# Patient Record
Sex: FEMALE | Race: WHITE | NOT HISPANIC OR LATINO | Employment: STUDENT | URBAN - METROPOLITAN AREA
[De-identification: names, ages, dates, MRNs, and addresses within clinical notes are randomized per-mention and may not be internally consistent; named-entity substitution may affect disease eponyms.]

---
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
## 2024-04-01 ENCOUNTER — OFFICE VISIT (OUTPATIENT)
Dept: URGENT CARE | Facility: CLINIC | Age: 19
End: 2024-04-01
Payer: COMMERCIAL

## 2024-04-01 VITALS
RESPIRATION RATE: 18 BRPM | SYSTOLIC BLOOD PRESSURE: 129 MMHG | OXYGEN SATURATION: 99 % | DIASTOLIC BLOOD PRESSURE: 91 MMHG | HEART RATE: 85 BPM | BODY MASS INDEX: 18.9 KG/M2 | WEIGHT: 84 LBS | HEIGHT: 56 IN | TEMPERATURE: 98 F

## 2024-04-01 DIAGNOSIS — S83.91XA SPRAIN OF RIGHT KNEE, UNSPECIFIED LIGAMENT, INITIAL ENCOUNTER: Primary | ICD-10-CM

## 2024-04-01 PROCEDURE — 99213 OFFICE O/P EST LOW 20 MIN: CPT | Mod: 25,S$GLB,, | Performed by: NURSE PRACTITIONER

## 2024-04-01 PROCEDURE — 96372 THER/PROPH/DIAG INJ SC/IM: CPT | Mod: S$GLB,,, | Performed by: NURSE PRACTITIONER

## 2024-04-01 RX ORDER — IBUPROFEN 400 MG/1
400 TABLET ORAL EVERY 8 HOURS PRN
Qty: 30 TABLET | Refills: 0 | Status: SHIPPED | OUTPATIENT
Start: 2024-04-01

## 2024-04-01 RX ORDER — METHYLPREDNISOLONE 4 MG/1
TABLET ORAL
Qty: 21 EACH | Refills: 0 | Status: SHIPPED | OUTPATIENT
Start: 2024-04-01 | End: 2024-04-22

## 2024-04-01 RX ORDER — KETOROLAC TROMETHAMINE 30 MG/ML
30 INJECTION, SOLUTION INTRAMUSCULAR; INTRAVENOUS
Status: COMPLETED | OUTPATIENT
Start: 2024-04-01 | End: 2024-04-01

## 2024-04-01 RX ADMIN — KETOROLAC TROMETHAMINE 30 MG: 30 INJECTION, SOLUTION INTRAMUSCULAR; INTRAVENOUS at 03:04

## 2024-04-01 NOTE — PROGRESS NOTES
"Subjective:      Patient ID: Danica Mock is a 18 y.o. female.    Vitals:  height is 4' 8" (1.422 m) and weight is 38.1 kg (84 lb). Her temperature is 98.3 °F (36.8 °C). Her blood pressure is 129/91 (abnormal) and her pulse is 85. Her respiration is 18 and oxygen saturation is 99%.     Chief Complaint: Knee Injury    This is a 18 y.o. female who presents today with a chief complaint of right knee pain X2 days. Pt ran 6-mile marathon when knee started hurting.  States it feels as though her knee was hit by a hammer. Denies fall or impact to the knee. Pt states the next day she started to feel a popping sensation-that has continued. Reports edema this morning that has since subsided.     Knee Pain   The incident occurred 12 to 24 hours ago. There was no injury mechanism. The pain is present in the right knee. Quality: soreness. The pain is at a severity of 5/10. The pain is moderate. The pain has been Fluctuating since onset. Pertinent negatives include no inability to bear weight, loss of motion, loss of sensation, muscle weakness, numbness or tingling. She reports no foreign bodies present. The symptoms are aggravated by movement and weight bearing. She has tried nothing for the symptoms.       Musculoskeletal:  Positive for pain, joint pain and joint swelling.   Neurological:  Negative for numbness.      Objective:     Physical Exam   Constitutional: She is oriented to person, place, and time.  Non-toxic appearance. She does not appear ill. No distress.   HENT:   Head: Normocephalic.   Nose: Nose normal.   Mouth/Throat: Mucous membranes are moist.   Cardiovascular: Normal rate.   Pulmonary/Chest: Effort normal.   Abdominal: Normal appearance.   Musculoskeletal: Normal range of motion.         General: Tenderness and signs of injury (overuse) present. No swelling or deformity. Normal range of motion.      Comments: No edema or erythema to right knee at this time. Has tenderness to palpation.    Neurological: She " is alert and oriented to person, place, and time.   Skin: Skin is warm, dry and not diaphoretic.   Psychiatric: Her behavior is normal. Mood normal.   Nursing note and vitals reviewed.      Assessment:     1. Sprain of right knee, unspecified ligament, initial encounter        Plan:       Sprain of right knee, unspecified ligament, initial encounter  -     ketorolac injection 30 mg  -     ibuprofen (ADVIL,MOTRIN) 400 MG tablet; Take 1 tablet (400 mg total) by mouth every 8 (eight) hours as needed for Pain.  Dispense: 30 tablet; Refill: 0  -     methylPREDNISolone (MEDROL DOSEPACK) 4 mg tablet; use as directed  Dispense: 21 each; Refill: 0  -     Ambulatory referral/consult to Orthopedics      Patient Instructions   A referral has been placed for you with Ortho. [facility] should call you to set up the appt, but if you do not hear from [facility], please call [phone] to make the appointment.      Rest the knee\  Ice packs  Wear your ace wrap for comfort  Elevate knee when at rest   Alternate ibuprofen with tylenol for pain relief

## 2024-04-01 NOTE — PATIENT INSTRUCTIONS
A referral has been placed for you with Ortho. [facility] should call you to set up the appt, but if you do not hear from [facility], please call [phone] to make the appointment.      Rest the knee\  Ice packs  Wear your ace wrap for comfort  Elevate knee when at rest   Alternate ibuprofen with tylenol for pain relief

## 2024-12-05 ENCOUNTER — OFFICE VISIT (OUTPATIENT)
Dept: PODIATRY | Facility: CLINIC | Age: 19
End: 2024-12-05
Payer: COMMERCIAL

## 2024-12-05 VITALS
DIASTOLIC BLOOD PRESSURE: 76 MMHG | WEIGHT: 86.44 LBS | HEIGHT: 56 IN | HEART RATE: 77 BPM | BODY MASS INDEX: 19.45 KG/M2 | SYSTOLIC BLOOD PRESSURE: 114 MMHG

## 2024-12-05 DIAGNOSIS — L60.9 DISEASE OF NAIL: Primary | ICD-10-CM

## 2024-12-05 PROCEDURE — 1159F MED LIST DOCD IN RCRD: CPT | Mod: CPTII,S$GLB,, | Performed by: PODIATRIST

## 2024-12-05 PROCEDURE — 99203 OFFICE O/P NEW LOW 30 MIN: CPT | Mod: S$GLB,,, | Performed by: PODIATRIST

## 2024-12-05 PROCEDURE — 3074F SYST BP LT 130 MM HG: CPT | Mod: CPTII,S$GLB,, | Performed by: PODIATRIST

## 2024-12-05 PROCEDURE — 87101 SKIN FUNGI CULTURE: CPT | Performed by: PODIATRIST

## 2024-12-05 PROCEDURE — 3078F DIAST BP <80 MM HG: CPT | Mod: CPTII,S$GLB,, | Performed by: PODIATRIST

## 2024-12-05 PROCEDURE — 99999 PR PBB SHADOW E&M-EST. PATIENT-LVL III: CPT | Mod: PBBFAC,,, | Performed by: PODIATRIST

## 2024-12-05 PROCEDURE — 3008F BODY MASS INDEX DOCD: CPT | Mod: CPTII,S$GLB,, | Performed by: PODIATRIST

## 2024-12-05 NOTE — PROGRESS NOTES
Subjective:      Patient ID: Danica Mock is a 19 y.o. female.    Chief Complaint: Nail Problem and Toe Pain (Left 2nd toe, only hurts when she tries to cut it)    Danica is a 19 y.o. female who presents to the clinic complaining of thick and discolored toenails on the left foot. Danica is inquiring about treatment options.    Review of Systems   Constitutional: Negative for chills, fever and malaise/fatigue.   HENT:  Negative for hearing loss.    Cardiovascular:  Negative for claudication.   Respiratory:  Negative for shortness of breath.    Skin:  Positive for nail changes. Negative for flushing and rash.   Musculoskeletal:  Negative for joint pain and myalgias.   Neurological:  Negative for loss of balance, numbness, paresthesias and sensory change.   Psychiatric/Behavioral:  Negative for altered mental status.          Objective:      Physical Exam  Vitals reviewed.   Cardiovascular:      Pulses:           Dorsalis pedis pulses are 2+ on the right side and 2+ on the left side.        Posterior tibial pulses are 2+ on the right side and 2+ on the left side.      Comments: No edema noted b/L  Musculoskeletal:      Comments:        Feet:      Right foot:      Protective Sensation: 5 sites tested.  5 sites sensed.      Left foot:      Protective Sensation: 5 sites tested.  5 sites sensed.   Skin:     General: Skin is warm.      Capillary Refill: Capillary refill takes 2 to 3 seconds.      Comments: Normal skin tugor noted.   No open lesion noted b/L  Skin temp is warm to warm from proximal to distal b/L.  Webspaces clean, dry, and intact  Left 2nd digit disocolored   Neurological:      Mental Status: She is alert.      Comments: Gross sensation intact b/L           Assessment:       Encounter Diagnosis   Name Primary?    Disease of nail Yes         Plan:       Danica was seen today for nail problem and toe pain.    Diagnoses and all orders for this visit:    Disease of nail  -     CULTURE, FUNGUS - SKIN, HAIR,  OR NAILS      I counseled the patient on her conditions, their implications and medical management.    Pt was seen today for changes in toenail. Pt was advised that nail changes could be due to toenail fungus or damage/trauma to toenail or toenail matrix.   Pt advised that there are currently no prescription options for nail damage/trauma.   Fungal culture taken of toenail? yes  Treatment options discussed with pt for toenails positive for toenail fungus including oral lamisil (that requires a hepatic function test) and topical treatments such as penlac and DMSO compound formula.   Pt advised she will be notified of culture results.       .